# Patient Record
Sex: MALE | Race: WHITE | NOT HISPANIC OR LATINO
[De-identification: names, ages, dates, MRNs, and addresses within clinical notes are randomized per-mention and may not be internally consistent; named-entity substitution may affect disease eponyms.]

---

## 2020-11-16 PROBLEM — Z00.00 ENCOUNTER FOR PREVENTIVE HEALTH EXAMINATION: Status: ACTIVE | Noted: 2020-11-16

## 2020-11-27 ENCOUNTER — APPOINTMENT (OUTPATIENT)
Dept: OTOLARYNGOLOGY | Facility: CLINIC | Age: 30
End: 2020-11-27
Payer: COMMERCIAL

## 2020-11-27 VITALS
DIASTOLIC BLOOD PRESSURE: 82 MMHG | SYSTOLIC BLOOD PRESSURE: 121 MMHG | WEIGHT: 150 LBS | BODY MASS INDEX: 24.11 KG/M2 | HEART RATE: 72 BPM | HEIGHT: 66 IN | TEMPERATURE: 98 F

## 2020-11-27 DIAGNOSIS — Z83.3 FAMILY HISTORY OF DIABETES MELLITUS: ICD-10-CM

## 2020-11-27 PROCEDURE — 99213 OFFICE O/P EST LOW 20 MIN: CPT

## 2020-11-27 NOTE — ADDENDUM
[FreeTextEntry1] : Documented by Bronwyn Delacruz acting as scribe for Dr. Mishra on 11/27/2020.\par \par All Medical record entries made by the Scribe were at my, Dr. Mishra, direction and personally dictated by me on 11/27/2020 . I have reviewed the chart and agree that the record accurately reflects my personal performance of the history, physical exam, assessment and plan. I have also personally directed, reviewed, and agreed with the discharge instructions.

## 2020-11-27 NOTE — HISTORY OF PRESENT ILLNESS
[de-identified] : The patient presents with h/o chronic bilateral OE, narrow ear canals. Pt presents today for cerumen removal. He doesn't feel his ears are as clogged as usual.

## 2020-11-27 NOTE — ASSESSMENT
[FreeTextEntry1] : h/o chronic bilateral OE, narrow ear canals\par \par Plan:\par Cerumen removed. FU 4 months.

## 2020-11-27 NOTE — PHYSICAL EXAM
[Hearing Wong Test (Tuning Fork On Forehead)] : no lateralization of tone [] : septum deviated to the right [Midline] : trachea is located in midline position [Clear / Open well] : hypopharynx is clear and opens well [Normal] : no rashes [FreeTextEntry8] : cerumen and dead skin removed via curettage  [FreeTextEntry9] : cerumen and dead skin removed via curettage, more compared to right side [de-identified] : mildly inflamed [FreeTextEntry1] : tonsils class 1, bad gag [FreeTextEntry2] : sinuses nontender to percussion

## 2021-04-23 ENCOUNTER — APPOINTMENT (OUTPATIENT)
Dept: OTOLARYNGOLOGY | Facility: CLINIC | Age: 31
End: 2021-04-23
Payer: COMMERCIAL

## 2021-04-23 VITALS
TEMPERATURE: 98 F | SYSTOLIC BLOOD PRESSURE: 128 MMHG | DIASTOLIC BLOOD PRESSURE: 88 MMHG | WEIGHT: 150 LBS | HEIGHT: 66 IN | HEART RATE: 75 BPM | BODY MASS INDEX: 24.11 KG/M2

## 2021-04-23 DIAGNOSIS — H10.13 ACUTE ATOPIC CONJUNCTIVITIS, BILATERAL: ICD-10-CM

## 2021-04-23 PROCEDURE — 99072 ADDL SUPL MATRL&STAF TM PHE: CPT

## 2021-04-23 PROCEDURE — 99213 OFFICE O/P EST LOW 20 MIN: CPT

## 2021-04-23 NOTE — ADDENDUM
[FreeTextEntry1] : Documented by Bronwyn Delacruz acting as scribe for Dr. Mishra on 04/23/2021.\par \par All Medical record entries made by the Scribe were at my, Dr. Mishra, direction and personally dictated by me on 04/23/2021 . I have reviewed the chart and agree that the record accurately reflects my personal performance of the history, physical exam, assessment and plan. I have also personally directed, reviewed, and agreed with the discharge instructions.

## 2021-04-23 NOTE — PHYSICAL EXAM
[Hearing Wong Test (Tuning Fork On Forehead)] : no lateralization of tone [Midline] : trachea located in midline position [Normal] : no rashes [FreeTextEntry8] : cerumen removed via curettage  [FreeTextEntry9] : cerumen removed via curettage [FreeTextEntry1] : deviated septum, mildly inflamed turbinates [FreeTextEntry2] : sinuses nontender to percussion

## 2021-04-23 NOTE — HISTORY OF PRESENT ILLNESS
[de-identified] : The patient presents with h/o chronic bilateral OE, narrow ear canals. Pt presents today with ear clogging for cerumen removal.

## 2021-04-23 NOTE — ASSESSMENT
[FreeTextEntry1] : Reviewed and reconciled medications, allergies, PMHx, PSHx, SocHx, FMHx.\par \par h/o chronic bilateral OE, narrow ear canals\par \par Plan:\par Cerumen removed. FU 4-5 months.

## 2021-09-15 ENCOUNTER — APPOINTMENT (OUTPATIENT)
Dept: OTOLARYNGOLOGY | Facility: CLINIC | Age: 31
End: 2021-09-15
Payer: COMMERCIAL

## 2021-09-15 VITALS
HEART RATE: 67 BPM | TEMPERATURE: 97.5 F | SYSTOLIC BLOOD PRESSURE: 132 MMHG | HEIGHT: 66 IN | DIASTOLIC BLOOD PRESSURE: 82 MMHG | WEIGHT: 150 LBS | BODY MASS INDEX: 24.11 KG/M2

## 2021-09-15 DIAGNOSIS — Z78.9 OTHER SPECIFIED HEALTH STATUS: ICD-10-CM

## 2021-09-15 PROCEDURE — 69210 REMOVE IMPACTED EAR WAX UNI: CPT

## 2021-09-15 PROCEDURE — 99213 OFFICE O/P EST LOW 20 MIN: CPT | Mod: 25

## 2021-09-15 NOTE — ASSESSMENT
[FreeTextEntry1] : Reviewed and reconciled medications, allergies, PMHx, PSHx, SocHx, FMHx.\par \par h/o chronic bilateral OE, narrow ear canals\par \par \par \par Plan:\par Bilateral Cerumen removal  FU 4-5 months.

## 2021-09-15 NOTE — PHYSICAL EXAM
[Hearing Wong Test (Tuning Fork On Forehead)] : no lateralization of tone [] : congested on the right side [Normal] : mucosa is normal [FreeTextEntry6] : small ear canals, cerumen impaction removed by curettage, fuzzy hairs in ear canal [FreeTextEntry7] : cerumen removed by curettage [FreeTextEntry5] : no lateralization [FreeTextEntry1] : no sinus tenderness, deviated septum, inflamed turbinates [de-identified] : oral cavity class 2 [de-identified] : tonsils class 1

## 2021-09-15 NOTE — HISTORY OF PRESENT ILLNESS
[de-identified] : The patient presents with h/o chronic bilateral OE, narrow ear canals. Pt presents today with mild ear clogging for possible cerumen removal.

## 2022-02-01 ENCOUNTER — APPOINTMENT (OUTPATIENT)
Dept: OTOLARYNGOLOGY | Facility: CLINIC | Age: 32
End: 2022-02-01
Payer: COMMERCIAL

## 2022-02-01 VITALS
HEIGHT: 66 IN | DIASTOLIC BLOOD PRESSURE: 88 MMHG | SYSTOLIC BLOOD PRESSURE: 137 MMHG | BODY MASS INDEX: 24.11 KG/M2 | HEART RATE: 69 BPM | WEIGHT: 150 LBS

## 2022-02-01 DIAGNOSIS — R04.0 EPISTAXIS: ICD-10-CM

## 2022-02-01 DIAGNOSIS — H93.13 TINNITUS, BILATERAL: ICD-10-CM

## 2022-02-01 PROCEDURE — 99213 OFFICE O/P EST LOW 20 MIN: CPT

## 2022-02-01 NOTE — ASSESSMENT
[FreeTextEntry1] : Reviewed and reconciled medications, allergies, PMHx, PSHx, SocHx, FMHx. \par \par h/o chronic bilateral OE, narrow ear canals\par clogged sensation- resolved with cerumen removal\par b/l tinnitus\par \par Plan:\par Cerumen removed. Control stress. Cut down alcohol and caffeine. Avoid loud noise exposure. Will begin work up for tinnitus if persists. FU 3-4 months.

## 2022-02-01 NOTE — PHYSICAL EXAM
[Hearing Wong Test (Tuning Fork On Forehead)] : no lateralization of tone [Normal] : lingual tonsils are normal [Midline] : trachea located in midline position [FreeTextEntry8] : Cerumen removed via curettage.  [FreeTextEntry9] : Cerumen removed via curettage.  [FreeTextEntry1] : dried blood. deviated septum [de-identified] : tonsils enlarged- left greater than right [de-identified] : bad gag

## 2022-02-01 NOTE — ADDENDUM
[FreeTextEntry1] : Documented by Shilo Romano acting as scribe for Dr. Mishra on 02/01/2022.\par \par All Medical record entries made by the Scribe were at my, Dr. Mishra, direction and personally dictated by me on 02/01/2022. I have reviewed the chart and agree that the record accurately reflects my personal performance of the history, physical exam, assessment and plan. I have also personally directed, reviewed, and agreed with the discharge instructions.

## 2022-02-01 NOTE — REASON FOR VISIT
[Subsequent Evaluation] : a subsequent evaluation for [Tinnitus] : tinnitus [FreeTextEntry2] : ear cleaning

## 2022-02-01 NOTE — HISTORY OF PRESENT ILLNESS
[de-identified] : The patient presents with h/o chronic bilateral OE, narrow ear canals. Pt presents today for cerumen removal. Pt reports feeling clogged. Additionally states that 3 weeks ago had COVID booster, and afterwards began  hearing b/l high pitched ringing noise. Use of  intensified sound Denies hearing loss.

## 2022-06-27 ENCOUNTER — APPOINTMENT (OUTPATIENT)
Dept: OTOLARYNGOLOGY | Facility: CLINIC | Age: 32
End: 2022-06-27
Payer: COMMERCIAL

## 2022-06-27 VITALS
BODY MASS INDEX: 24.11 KG/M2 | HEIGHT: 66 IN | HEART RATE: 69 BPM | SYSTOLIC BLOOD PRESSURE: 129 MMHG | WEIGHT: 150 LBS | DIASTOLIC BLOOD PRESSURE: 82 MMHG

## 2022-06-27 DIAGNOSIS — J35.1 HYPERTROPHY OF TONSILS: ICD-10-CM

## 2022-06-27 PROCEDURE — 99213 OFFICE O/P EST LOW 20 MIN: CPT

## 2022-06-27 RX ORDER — FLUTICASONE PROPIONATE 50 MCG
SPRAY, SUSPENSION NASAL
Refills: 0 | Status: DISCONTINUED | COMMUNITY
End: 2022-06-27

## 2022-06-27 NOTE — ADDENDUM
[FreeTextEntry1] : Documented by Lisa Simpson acting as scribe for Dr. Mishra on 06/27/2022.\par \par All Medical record entries made by the scribe were at my, Dr. Mishra,direction and personally dictated by me on 06/27/2022. I have reviewed the chart and agree that the record accurately reflects my personal performance of the history, physical exam, assessment and plan. I have also personally directed, reviewed, and agreed with the discharge instructions.

## 2022-06-27 NOTE — PHYSICAL EXAM
[Hearing Wong Test (Tuning Fork On Forehead)] : no lateralization of tone [Midline] : trachea located in midline position [Normal] : orientation to person, place, and time: normal [FreeTextEntry6] : narrow, small canals [FreeTextEntry7] : narrow, small canals [FreeTextEntry8] : cerumen removed via curettage  [FreeTextEntry9] : cerumen removed via curettage  [FreeTextEntry1] : mild deviation of septum R>L\par mildly inflamed turbs [de-identified] : class 2 [de-identified] : elongated uvula [FreeTextEntry2] : sinuses nontender to percussion.

## 2022-06-27 NOTE — ASSESSMENT
[FreeTextEntry1] : Reviewed and reconciled medications, allergies, PMHx, PSHx, SocHx, FMHx.\par \par h/o chronic bilateral OE, narrow ear canals\par presents today for ear cleaning\par \par cerumen removed b/l. \par mildly deviated septum r>l, mildly inflamed turbs\par \par Plan:\par cerumen removed b/l. FU 4-5 months.

## 2022-06-27 NOTE — HISTORY OF PRESENT ILLNESS
[de-identified] : Pt presents with h/o chronic bilateral OE, narrow ear canals. Pt presents today for an ear cleaning. Pt reports he feels clogged. Pt reports he used to get clogged more often when he was younger, but it has gotten better over the years.

## 2022-11-01 ENCOUNTER — APPOINTMENT (OUTPATIENT)
Dept: OTOLARYNGOLOGY | Facility: CLINIC | Age: 32
End: 2022-11-01

## 2022-11-01 VITALS
BODY MASS INDEX: 24.11 KG/M2 | SYSTOLIC BLOOD PRESSURE: 149 MMHG | DIASTOLIC BLOOD PRESSURE: 85 MMHG | HEIGHT: 66 IN | WEIGHT: 150 LBS | HEART RATE: 76 BPM

## 2022-11-01 DIAGNOSIS — H61.22 IMPACTED CERUMEN, LEFT EAR: ICD-10-CM

## 2022-11-01 PROCEDURE — 69210 REMOVE IMPACTED EAR WAX UNI: CPT

## 2022-11-01 PROCEDURE — 99213 OFFICE O/P EST LOW 20 MIN: CPT | Mod: 25

## 2022-11-01 NOTE — ASSESSMENT
Patient verified name, , and procedure. Type: 1a; abbreviated assessment per anesthesia guidelines  Labs per surgeon: none received. Labs per anesthesia: POC glucose DOS. Instructed pt that they will be notified by the doctor office for time of arrival to GI lab. If any questions please call the GI lab at [phone]. Follow diet and prep instructions per office. May have clear liquids until 4 hours prior to time of arrival.    [place] or shower the night before and the am of surgery with antibacterial soap. No lotions, oils, powders, cologne on skin. No make up, eye make up or jewelry. Wear loose fitting comfortable, clean clothing. Must have adult present in building the entire time . Medications for the day of procedure:Nadolol, Flonase as needed, allopurinol, Famotidine. Patient instructed to take only 80% of lantus insulin the night before and the morning of theprocedure, 37 units in the pm and 42 units in the am. Per anesthesia protocol: If you feel symptoms of low blood sugar while fasting, check level. If low, drink only 4 ounces of a clear, sugary liquid and call pre-op at [phone]. Patient verbalized understanding. The following discharge instructions reviewed with patient: medication given during procedure may cause drowsiness for several hours, therefore, do not drive or operate machinery for remainder of the day, no alcohol on the day of your procedure, resume regular diet and activity unless otherwise directed, for mild sore throat you may use Cepacol throat lozenges or warm salt water gargles as needed, call your physician for any problems or questions. Patient verbalizes understanding. [FreeTextEntry1] : Reviewed and reconciled medications, allergies, PMHx, PSHx, SocHx, FMHx \par \par Pt. with h/o chronic bilateral OE and narrow ear canals presents today stating his ears are feeling clogged up.\par \par Physical Exam:\par -Right ear: Small narrow canals. Cerumen removed via curettage \par -Left ear: cerumen impaction removed via curettage \par -chronic OE b/l\par -mildly deviated septum\par -mildly inflamed turbinates\par -enlongated uvula\par -tonsils class 2\par \par Plan: FU 3-4 months

## 2022-11-01 NOTE — PHYSICAL EXAM
[Hearing Wong Test (Tuning Fork On Forehead)] : no lateralization of tone [Normal] : mucosa is normal [Midline] : trachea located in midline position [FreeTextEntry8] : Small narrow canals. Cerumen removed via curettage  [FreeTextEntry9] : cerumen impaction removed via curettage  [FreeTextEntry1] : -mildly deviated septum\par -mildly inflamed turbinates [de-identified] : enlongated rj  [de-identified] : tonsils class 2

## 2022-11-01 NOTE — CONSULT LETTER
[Dear  ___] : Dear  [unfilled], [Courtesy Letter:] : I had the pleasure of seeing your patient, [unfilled], in my office today. [Please see my note below.] : Please see my note below. [Consult Closing:] : Thank you very much for allowing me to participate in the care of this patient.  If you have any questions, please do not hesitate to contact me. [Sincerely,] : Sincerely, [FreeTextEntry1] : Roman Mishra MD FACS

## 2022-11-01 NOTE — HISTORY OF PRESENT ILLNESS
[de-identified] : Pt. with h/o chronic bilateral OE and narrow ear canals presents today stating his ears are feeling clogged up.

## 2022-11-07 ENCOUNTER — EMERGENCY (EMERGENCY)
Facility: HOSPITAL | Age: 32
LOS: 0 days | Discharge: HOME | End: 2022-11-07
Attending: EMERGENCY MEDICINE
Payer: COMMERCIAL

## 2022-11-07 VITALS
HEART RATE: 91 BPM | OXYGEN SATURATION: 98 % | SYSTOLIC BLOOD PRESSURE: 136 MMHG | RESPIRATION RATE: 18 BRPM | TEMPERATURE: 98 F | WEIGHT: 149.91 LBS | DIASTOLIC BLOOD PRESSURE: 84 MMHG

## 2022-11-07 DIAGNOSIS — S61.216A LACERATION WITHOUT FOREIGN BODY OF RIGHT LITTLE FINGER WITHOUT DAMAGE TO NAIL, INITIAL ENCOUNTER: ICD-10-CM

## 2022-11-07 DIAGNOSIS — S61.211A LACERATION WITHOUT FOREIGN BODY OF LEFT INDEX FINGER WITHOUT DAMAGE TO NAIL, INITIAL ENCOUNTER: ICD-10-CM

## 2022-11-07 DIAGNOSIS — Z23 ENCOUNTER FOR IMMUNIZATION: ICD-10-CM
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
## 2022-11-07 DIAGNOSIS — S61.217A LACERATION WITHOUT FOREIGN BODY OF LEFT LITTLE FINGER WITHOUT DAMAGE TO NAIL, INITIAL ENCOUNTER: ICD-10-CM
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
## 2022-11-07 PROCEDURE — 99283 EMERGENCY DEPT VISIT LOW MDM: CPT | Mod: 25

## 2022-11-07 PROCEDURE — 12001 RPR S/N/AX/GEN/TRNK 2.5CM/<: CPT

## 2022-11-07 PROCEDURE — 12001 RPR S/N/AX/GEN/TRNK 2.5CM/<: CPT | Mod: F4

## 2022-11-07 PROCEDURE — 90471 IMMUNIZATION ADMIN: CPT

## 2022-11-07 PROCEDURE — 90715 TDAP VACCINE 7 YRS/> IM: CPT

## 2022-11-07 RX ORDER — TETANUS TOXOID, REDUCED DIPHTHERIA TOXOID AND ACELLULAR PERTUSSIS VACCINE, ADSORBED 5; 2.5; 8; 8; 2.5 [IU]/.5ML; [IU]/.5ML; UG/.5ML; UG/.5ML; UG/.5ML
0.5 SUSPENSION INTRAMUSCULAR ONCE
Refills: 0 | Status: COMPLETED | OUTPATIENT
Start: 2022-11-07 | End: 2022-11-07

## 2022-11-07 RX ADMIN — TETANUS TOXOID, REDUCED DIPHTHERIA TOXOID AND ACELLULAR PERTUSSIS VACCINE, ADSORBED 0.5 MILLILITER(S): 5; 2.5; 8; 8; 2.5 SUSPENSION INTRAMUSCULAR at 11:15
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
## 2022-11-07 NOTE — ED STATDOCS - SKIN, MLM
skin normal color for race, warm, dry. Lacerations on left index finger and left 5th digit both less than a centimeter. 2 lacerations on right 5th digit at DIP joint.

## 2022-11-07 NOTE — ED STATDOCS - CLINICAL SUMMARY MEDICAL DECISION MAKING FREE TEXT BOX
Plan: local wound care, Tdap. Plan: local wound care, Tdap.          Tdap administered.  Dermal glue to fifth finger laceration.  Angelica Estrada PA-C

## 2022-11-07 NOTE — ED STATDOCS - PATIENT PORTAL LINK FT
You can access the FollowMyHealth Patient Portal offered by [facility] by registering at the following website: [email or web address]. By joining [employer]’s FollowMyHealth portal, you will also be able to view your health information using other applications (apps) compatible with our system.

## 2022-11-07 NOTE — ED STATDOCS - NSFOLLOWUPINSTRUCTIONS_ED_ALL_ED_FT
Sutures, Staples, or Adhesive Wound Closure      Wound closure refers to holding skin and underlying tissue together while it heals, such as after surgery or after an injury. Health care providers use stitches (sutures), staples, skin glue (tissue adhesive), and adhesive strips to close wounds. Your health care provider will use a wound closure method that helps you heal quickly and reduces the chances of infection or scarring. The type of wound closure depends on the location, size, and depth of your wound. More than one type of wound closure may be used on the same wound.    In most cases, wounds are closed as soon as possible (primary skin closure). Sometimes, closure is delayed so the wound can be cleaned and then can heal naturally over weeks or months (delayed wound closure). This reduces the chance of infection.      What are the different types of wound closure?    Skin glue     To use skin glue, your health care provider will hold the edges of the wound together and will paint the glue on the surface of your skin. You may need more than one layer of glue. Once the glue is dry, the wound may be covered with a bandage (dressing).    This type of skin closure may be used for small wounds that are not deep (superficial wounds). It is often used for children and on facial wounds. Skin glue is less painful than other methods of wound closure, and it does not require medicine to numb the area (local anesthetic). This method also leaves nothing to be removed.    Skin glue cannot be used for wounds that are deep, uneven, or bleeding. It is not used inside of a wound.    Adhesive strips     These strips are made of paper that is sticky (adhesive) and has many small holes in it. The strips are applied across your wound edges like a regular bandage.    Adhesive strips may be used to close very shallow wounds or surgical wounds. They may be used along with sutures to improve skin closure.    Sutures     Sutures come in many different materials, strengths, and sizes. They may break down as your wound heals (absorbable), or they may need to be removed (nonabsorbable).    Your health care provider will sew your skin or the tissues under your skin together with sutures and a steel needle. Your skin edges may be closed in one long stitch or in separate stitches. Then the sutures will be tied and cut.    Sutures can be used for all kinds of wounds. Absorbable sutures may be used to close tissues under the skin. Sutures can cause a skin reaction that can lead to infection.    Staples     To close a wound with staples, the edges of your skin on both sides of the wound will be brought close together. A staple will then be placed across the wound, and an instrument will secure the staple edges together.    Staples are often used to close surgical incisions. They are faster to use than sutures, and they cause less skin reaction. Staples need to be removed using a tool that bends the staples away from your skin.      Follow these instructions at home:    Medicines     •Take over-the-counter and prescription medicines only as told by your health care provider.      •If you were prescribed an antibiotic medicine, take it as told by your health care provider. Do not stop taking the antibiotic even if you start to feel better.        Wound care   Two stitched wounds. One is normal. The other is red with pus and infected.   •Follow instructions from your health care provider about how to take care of your wound and dressing.      •Wash your hands with soap and water for at least 20 seconds before and after you change your dressing. If soap and water are not available, use hand .    • Do not try to remove your wound closures unless your health care provider tells you to do that. You may need a follow-up visit with your health care provider to remove your closures.  •Wound closures may stay in place for 2 weeks or longer.      •Absorbable sutures may dissolve after a few days or weeks.      •If adhesive strip edges start to loosen and curl up, you may trim the loose edges.        • Do not pick at your wound. Picking can cause an infection or cause your wound to reopen.      •Apply ointments or creams only as told by your health care provider.    •Check your wound every day for signs of infection. Check for:  •Redness, swelling, or pain.      •Fluid or blood.      •New warmth, a rash, or hardness at the wound site.      •Pus or a bad smell.        General instructions     • Do not take baths, swim, or use a hot tub until your health care provider approves. Ask your health care provider if you may take showers. You may only be allowed to take sponge baths.      • Do not soak your wound in water.      •Eat a diet that includes protein, vitamin A, and vitamin C to help the wound heal.      •Drink enough fluid to keep your urine pale yellow.      •Keep all follow-up visits. This is important.        Contact a health care provider if:    •You have a fever or chills.      •You have redness, swelling, or pain around your wound.      •You have fluid or blood coming from your wound.      •You have new warmth, a rash, or hardness around your wound.      •You notice that your wound becomes thick, raised, and darker in color after your sutures come out (scarring).        Get help right away if:    •The edges of your wound start to separate, or the wound reopens.      •You notice pus or a bad smell coming from your wound.        Summary    •The type of wound closure that your health care provider will use depends on the location, size, and depth of your wound. Options to close wounds include stitches (sutures), staples, skin glue (tissue adhesive), and adhesive strips.      •Your health care provider will use a wound closure method that helps you heal quickly and reduces the chances of infection or scarring.      •To help with healing, eat foods that are rich in protein, vitamin A, and vitamin C.      • Do not soak your wound in water. Do not take baths, shower, swim, or use a hot tub until your health care provider approves.      This information is not intended to replace advice given to you by your health care provider. Make sure you discuss any questions you have with your health care provider.
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
## 2022-11-07 NOTE — ED STATDOCS - ATTENDING APP SHARED VISIT CONTRIBUTION OF CARE
I personally saw the patient with the NEW, and completed the key components of the history and physical exam. I then discussed the management plan with the NEW.
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
## 2022-11-07 NOTE — ED ADULT TRIAGE NOTE - CHIEF COMPLAINT QUOTE
pt presents to ED due to lacerations on BL hands right hand 5th digit left hand index and 5th digit pt injured himself while at work

## 2022-11-07 NOTE — ED STATDOCS - CARE PROVIDER_API CALL
Edilberto Hansen)  Orthopaedic Surgery; Surgery of the Hand  [address] [place] [address]  Phone: [phone]  Fax: [phone]  Follow Up Time:

## 2023-03-13 PROBLEM — Z78.9 OTHER SPECIFIED HEALTH STATUS: Chronic | Status: ACTIVE | Noted: 2022-11-07

## 2023-03-31 ENCOUNTER — APPOINTMENT (OUTPATIENT)
Dept: OTOLARYNGOLOGY | Facility: CLINIC | Age: 33
End: 2023-03-31
Payer: COMMERCIAL

## 2023-03-31 VITALS
HEART RATE: 67 BPM | WEIGHT: 150 LBS | SYSTOLIC BLOOD PRESSURE: 136 MMHG | HEIGHT: 66 IN | DIASTOLIC BLOOD PRESSURE: 81 MMHG | BODY MASS INDEX: 24.11 KG/M2

## 2023-03-31 DIAGNOSIS — H61.23 IMPACTED CERUMEN, BILATERAL: ICD-10-CM

## 2023-03-31 PROCEDURE — 69210 REMOVE IMPACTED EAR WAX UNI: CPT

## 2023-03-31 NOTE — PHYSICAL EXAM
[Normal] : the left mastoid was normal [Hearing Wong Test (Tuning Fork On Forehead)] : no lateralization of tone [FreeTextEntry8] : cerumen impaction removed via curettage  [FreeTextEntry9] : cerumen impaction removed via curettage

## 2023-03-31 NOTE — ASSESSMENT
[FreeTextEntry1] : Reviewed and reconciled medications, allergies, PMHx, PSHx, SocHx, FMHx \par \par Pt. with h/o chronic bilateral OE and narrow ear canals presents today for a follow up. Patient states his ears are feeling clogged up. \par \par Physical Exam:\par -Right Ear: cerumen impaction removed via curettage \par -Left Ear: cerumen impaction removed via curettage \par \par Plan: FU 3-4 months

## 2023-03-31 NOTE — HISTORY OF PRESENT ILLNESS
[de-identified] : Pt. with h/o chronic bilateral OE and narrow ear canals presents today for a follow up. Patient states his ears are feeling clogged up.

## 2023-07-28 ENCOUNTER — APPOINTMENT (OUTPATIENT)
Dept: OTOLARYNGOLOGY | Facility: CLINIC | Age: 33
End: 2023-07-28
Payer: COMMERCIAL

## 2023-07-28 VITALS
DIASTOLIC BLOOD PRESSURE: 79 MMHG | WEIGHT: 150 LBS | BODY MASS INDEX: 24.11 KG/M2 | HEART RATE: 76 BPM | SYSTOLIC BLOOD PRESSURE: 120 MMHG | HEIGHT: 66 IN

## 2023-07-28 DIAGNOSIS — K13.79 OTHER LESIONS OF ORAL MUCOSA: ICD-10-CM

## 2023-07-28 PROCEDURE — 99213 OFFICE O/P EST LOW 20 MIN: CPT

## 2023-07-28 NOTE — HISTORY OF PRESENT ILLNESS
[de-identified] : Pt. with h/o chronic bilateral OE and narrow ear canals presents today for a follow up. Patient states he is feeling okay. Notes his ears feel clogged.

## 2023-07-28 NOTE — END OF VISIT
[FreeTextEntry2] : Documented by Pilar Saavedra acting as a scribe for Dr. Mishra on 07/28/2023.\par \par All medical record entries made by the scribe were at my, , direction and personally dictated bby me on 07/28/2023. I have reviewed the chart and agree that the record accurately reflects my personal performance of the history, physical exam, assessment and plan. I have also personally directed, reviewed and agreed with the discharge instructions.

## 2023-07-28 NOTE — PHYSICAL EXAM
[Hearing Wong Test (Tuning Fork On Forehead)] : no lateralization of tone [] : septum deviated to the left [Midline] : trachea located in midline position [Leukoplakia] : leukoplakia [Normal] : no rashes [FreeTextEntry1] : sensations intact. sinuses nontender. [FreeTextEntry8] : Cerumen removed via curettage  [FreeTextEntry9] : Cerumen removed via curettage  [de-identified] : leukoplakia on the cheek, elongated uvula [de-identified] : class 1

## 2023-07-28 NOTE — ASSESSMENT
[FreeTextEntry1] : Reviewed and reconciled medication, allergies, PMHx, PSHx, SocHx, FMHx.\par \par Pt. with h/o chronic bilateral OE and narrow ear canals presents today for a follow up. Patient states he is feeling okay. Notes his ears feel clogged.\par \par \par Physical Exam:\par -Right Ear: Cerumen removed via curettage \par -Left Ear: Cerumen removed via curettage \par -Leukoplakia on the cheek. elongated uvula\par -Class 1 tonsil\par \par Plan: Follow up in 4 months

## 2023-07-28 NOTE — CONSULT LETTER
[Dear  ___] : Dear  [unfilled], [Courtesy Letter:] : I had the pleasure of seeing your patient, [unfilled], in my office today. [Please see my note below.] : Please see my note below. [Consult Closing:] : Thank you very much for allowing me to participate in the care of this patient.  If you have any questions, please do not hesitate to contact me. [Sincerely,] : Sincerely, [FreeTextEntry1] : Roman Mishra MS, FACS

## 2024-03-11 ENCOUNTER — APPOINTMENT (OUTPATIENT)
Dept: OTOLARYNGOLOGY | Facility: CLINIC | Age: 34
End: 2024-03-11
Payer: COMMERCIAL

## 2024-03-11 VITALS
DIASTOLIC BLOOD PRESSURE: 75 MMHG | HEART RATE: 61 BPM | SYSTOLIC BLOOD PRESSURE: 114 MMHG | BODY MASS INDEX: 24.11 KG/M2 | HEIGHT: 66 IN | WEIGHT: 150 LBS

## 2024-03-11 DIAGNOSIS — J31.0 CHRONIC RHINITIS: ICD-10-CM

## 2024-03-11 DIAGNOSIS — H60.63 UNSPECIFIED CHRONIC OTITIS EXTERNA, BILATERAL: ICD-10-CM

## 2024-03-11 DIAGNOSIS — H93.8X3 OTHER SPECIFIED DISORDERS OF EAR, BILATERAL: ICD-10-CM

## 2024-03-11 DIAGNOSIS — J34.2 DEVIATED NASAL SEPTUM: ICD-10-CM

## 2024-03-11 PROCEDURE — 99213 OFFICE O/P EST LOW 20 MIN: CPT

## 2024-03-11 NOTE — PHYSICAL EXAM
[Hearing Wong Test (Tuning Fork On Forehead)] : no lateralization of tone [] : septum deviated to the right [Normal] : lingual tonsils are normal [Midline] : trachea located in midline position [Hearing Loss Right Only] : normal [Hearing Loss Left Only] : normal [FreeTextEntry8] :  cerumen removed via curettage. narrow ear canal [FreeTextEntry9] :  cerumen removed via curettage. narrow ear canal [de-identified] :  mildly inflamed turbinates [de-identified] : class 2 [de-identified] : leukoplakia on cheeks. mild edema of uvula

## 2024-03-11 NOTE — CONSULT LETTER
[Dear  ___] : Dear  [unfilled], [Courtesy Letter:] : I had the pleasure of seeing your patient, [unfilled], in my office today. [Please see my note below.] : Please see my note below. [Consult Closing:] : Thank you very much for allowing me to participate in the care of this patient.  If you have any questions, please do not hesitate to contact me. [Sincerely,] : Sincerely, [FreeTextEntry3] :  Roman Mishra MD FACS

## 2024-03-11 NOTE — ASSESSMENT
[FreeTextEntry1] :  Reviewed and reconciled medications, allergies, PMHx, PSHx, SocHx, FMHx.  Pt. with h/o chronic bilateral OE and narrow ear canals presents today stating that his ears feel clogged.  Physical exam: -right ear: cerumen removed via curettage. narrow ear canal -left ear: cerumen removed via curettage. narrow ear canal -no lateralization to tuning forks -deviated septum right -mildly inflamed turbinates -leukoplakia on cheeks -mild edema of uvula -class 2 tonsils  Plan: -FU in 4 months

## 2024-03-11 NOTE — HISTORY OF PRESENT ILLNESS
[de-identified] : Pt. with h/o chronic bilateral OE and narrow ear canals presents today stating that his ears feel clogged.

## 2024-03-11 NOTE — ADDENDUM
[FreeTextEntry1] :  Documented by Rashard Goodwin acting as scribe for Dr. Mishra on 03/11/2024. All Medical record entries made by the Scribe were at my, Dr. Mishra, direction and personally dictated by me on 03/11/2024 . I have reviewed the chart and agree that the record accurately reflects my personal performance of the history, physical exam, assessment and plan. I have also personally directed, reviewed, and agreed with the discharge instructions.

## 2024-07-23 ENCOUNTER — NON-APPOINTMENT (OUTPATIENT)
Age: 34
End: 2024-07-23

## 2024-07-24 ENCOUNTER — APPOINTMENT (OUTPATIENT)
Dept: OTOLARYNGOLOGY | Facility: CLINIC | Age: 34
End: 2024-07-24
Payer: COMMERCIAL

## 2024-07-24 VITALS
HEIGHT: 66 IN | WEIGHT: 150 LBS | SYSTOLIC BLOOD PRESSURE: 121 MMHG | HEART RATE: 80 BPM | DIASTOLIC BLOOD PRESSURE: 78 MMHG | BODY MASS INDEX: 24.11 KG/M2

## 2024-07-24 DIAGNOSIS — H93.8X3 OTHER SPECIFIED DISORDERS OF EAR, BILATERAL: ICD-10-CM

## 2024-07-24 DIAGNOSIS — H60.63 UNSPECIFIED CHRONIC OTITIS EXTERNA, BILATERAL: ICD-10-CM

## 2024-07-24 DIAGNOSIS — J31.0 CHRONIC RHINITIS: ICD-10-CM

## 2024-07-24 DIAGNOSIS — K13.79 OTHER LESIONS OF ORAL MUCOSA: ICD-10-CM

## 2024-07-24 PROCEDURE — 99213 OFFICE O/P EST LOW 20 MIN: CPT

## 2024-07-24 NOTE — ASSESSMENT
[FreeTextEntry1] :  Reviewed and reconciled medications, allergies, PMHx, PSHx, SocHx, FMHx.  Pt. with h/o chronic bilateral OE and narrow ear canals presents today for a follow up. He states that his ears are feeling clogged.   Physical exam: -right ear canal: cerumen removed via curettage -left ear canal: cerumen removed via curettage -no lateralization to tuning forks -mildly inflamed turbinates -class 1 tonsils   Plan: -FU in 4 months

## 2024-07-24 NOTE — HISTORY OF PRESENT ILLNESS
[de-identified] : Pt. with h/o chronic bilateral OE and narrow ear canals presents today for a follow up. He states that his ears are feeling clogged.

## 2024-07-24 NOTE — ADDENDUM
[FreeTextEntry1] :  Documented by Rashard Goodwin acting as scribe for Dr. Mishra on 07/24/2024. All Medical record entries made by the Scribe were at my, Dr. Mishra, direction and personally dictated by me on 07/24/2024 . I have reviewed the chart and agree that the record accurately reflects my personal performance of the history, physical exam, assessment and plan. I have also personally directed, reviewed, and agreed with the discharge instructions.

## 2024-07-24 NOTE — PHYSICAL EXAM
[Hearing Wong Test (Tuning Fork On Forehead)] : no lateralization of tone [Midline] : trachea located in midline position [Normal] : no rashes [Hearing Loss Right Only] : normal [Hearing Loss Left Only] : normal [FreeTextEntry8] :  cerumen removed via curettage [FreeTextEntry9] :  cerumen removed via curettage [de-identified] :  mildly inflamed turbinates [de-identified] : class 1

## 2024-12-06 ENCOUNTER — APPOINTMENT (OUTPATIENT)
Dept: OTOLARYNGOLOGY | Facility: CLINIC | Age: 34
End: 2024-12-06
Payer: COMMERCIAL

## 2024-12-06 VITALS
DIASTOLIC BLOOD PRESSURE: 76 MMHG | BODY MASS INDEX: 22.66 KG/M2 | HEART RATE: 67 BPM | HEIGHT: 66 IN | SYSTOLIC BLOOD PRESSURE: 118 MMHG | WEIGHT: 141 LBS

## 2024-12-06 DIAGNOSIS — H61.23 IMPACTED CERUMEN, BILATERAL: ICD-10-CM

## 2024-12-06 DIAGNOSIS — H60.63 UNSPECIFIED CHRONIC OTITIS EXTERNA, BILATERAL: ICD-10-CM

## 2024-12-06 PROCEDURE — 69210 REMOVE IMPACTED EAR WAX UNI: CPT

## 2025-04-21 ENCOUNTER — NON-APPOINTMENT (OUTPATIENT)
Age: 35
End: 2025-04-21

## 2025-04-21 ENCOUNTER — APPOINTMENT (OUTPATIENT)
Dept: OTOLARYNGOLOGY | Facility: CLINIC | Age: 35
End: 2025-04-21
Payer: SELF-PAY

## 2025-04-21 VITALS
DIASTOLIC BLOOD PRESSURE: 68 MMHG | HEIGHT: 66 IN | WEIGHT: 150 LBS | SYSTOLIC BLOOD PRESSURE: 123 MMHG | BODY MASS INDEX: 24.11 KG/M2 | HEART RATE: 78 BPM

## 2025-04-21 DIAGNOSIS — H61.23 IMPACTED CERUMEN, BILATERAL: ICD-10-CM

## 2025-04-21 DIAGNOSIS — J31.0 CHRONIC RHINITIS: ICD-10-CM

## 2025-04-21 DIAGNOSIS — L73.9 FOLLICULAR DISORDER, UNSPECIFIED: ICD-10-CM

## 2025-04-21 DIAGNOSIS — K13.79 OTHER LESIONS OF ORAL MUCOSA: ICD-10-CM

## 2025-04-21 DIAGNOSIS — H93.8X3 OTHER SPECIFIED DISORDERS OF EAR, BILATERAL: ICD-10-CM

## 2025-04-21 DIAGNOSIS — J34.2 DEVIATED NASAL SEPTUM: ICD-10-CM

## 2025-04-21 PROCEDURE — 99213 OFFICE O/P EST LOW 20 MIN: CPT | Mod: 25

## 2025-04-21 RX ORDER — MUPIROCIN 20 MG/G
2 OINTMENT TOPICAL 3 TIMES DAILY
Qty: 1 | Refills: 3 | Status: ACTIVE | COMMUNITY
Start: 2025-04-21 | End: 1900-01-01

## 2025-09-03 ENCOUNTER — APPOINTMENT (OUTPATIENT)
Dept: OTOLARYNGOLOGY | Facility: CLINIC | Age: 35
End: 2025-09-03
Payer: COMMERCIAL

## 2025-09-03 VITALS
HEART RATE: 60 BPM | HEIGHT: 66 IN | WEIGHT: 145 LBS | BODY MASS INDEX: 23.3 KG/M2 | SYSTOLIC BLOOD PRESSURE: 129 MMHG | DIASTOLIC BLOOD PRESSURE: 83 MMHG

## 2025-09-03 DIAGNOSIS — H93.8X3 OTHER SPECIFIED DISORDERS OF EAR, BILATERAL: ICD-10-CM

## 2025-09-03 DIAGNOSIS — H60.63 UNSPECIFIED CHRONIC OTITIS EXTERNA, BILATERAL: ICD-10-CM

## 2025-09-03 DIAGNOSIS — H61.23 IMPACTED CERUMEN, BILATERAL: ICD-10-CM

## 2025-09-03 PROCEDURE — 69210 REMOVE IMPACTED EAR WAX UNI: CPT | Mod: LT,59
